# Patient Record
Sex: FEMALE | Race: WHITE
[De-identification: names, ages, dates, MRNs, and addresses within clinical notes are randomized per-mention and may not be internally consistent; named-entity substitution may affect disease eponyms.]

---

## 2019-01-27 ENCOUNTER — HOSPITAL ENCOUNTER (INPATIENT)
Dept: HOSPITAL 26 - MED | Age: 84
LOS: 1 days | Discharge: ANOTHER HOSPITAL | DRG: 871 | End: 2019-01-28
Attending: GENERAL PRACTICE | Admitting: GENERAL PRACTICE
Payer: COMMERCIAL

## 2019-01-27 VITALS — SYSTOLIC BLOOD PRESSURE: 102 MMHG | DIASTOLIC BLOOD PRESSURE: 58 MMHG

## 2019-01-27 VITALS
HEIGHT: 67 IN | DIASTOLIC BLOOD PRESSURE: 56 MMHG | BODY MASS INDEX: 32.04 KG/M2 | SYSTOLIC BLOOD PRESSURE: 118 MMHG | WEIGHT: 204.13 LBS

## 2019-01-27 VITALS — DIASTOLIC BLOOD PRESSURE: 48 MMHG | SYSTOLIC BLOOD PRESSURE: 104 MMHG

## 2019-01-27 DIAGNOSIS — N39.0: ICD-10-CM

## 2019-01-27 DIAGNOSIS — Z79.01: ICD-10-CM

## 2019-01-27 DIAGNOSIS — H35.30: ICD-10-CM

## 2019-01-27 DIAGNOSIS — E11.65: ICD-10-CM

## 2019-01-27 DIAGNOSIS — E83.42: ICD-10-CM

## 2019-01-27 DIAGNOSIS — Z79.84: ICD-10-CM

## 2019-01-27 DIAGNOSIS — K58.9: ICD-10-CM

## 2019-01-27 DIAGNOSIS — Z95.0: ICD-10-CM

## 2019-01-27 DIAGNOSIS — Z86.73: ICD-10-CM

## 2019-01-27 DIAGNOSIS — E87.1: ICD-10-CM

## 2019-01-27 DIAGNOSIS — I24.9: ICD-10-CM

## 2019-01-27 DIAGNOSIS — Z79.899: ICD-10-CM

## 2019-01-27 DIAGNOSIS — M94.0: ICD-10-CM

## 2019-01-27 DIAGNOSIS — I21.A1: ICD-10-CM

## 2019-01-27 DIAGNOSIS — A41.9: Primary | ICD-10-CM

## 2019-01-27 DIAGNOSIS — I10: ICD-10-CM

## 2019-01-27 DIAGNOSIS — I49.5: ICD-10-CM

## 2019-01-27 DIAGNOSIS — K21.9: ICD-10-CM

## 2019-01-27 DIAGNOSIS — N17.0: ICD-10-CM

## 2019-01-27 LAB
ALBUMIN FLD-MCNC: 3.6 G/DL (ref 3.4–5)
AMYLASE SERPL-CCNC: 13 U/L (ref 25–115)
ANION GAP SERPL CALCULATED.3IONS-SCNC: 15.8 MMOL/L (ref 8–16)
APPEARANCE UR: (no result)
AST SERPL-CCNC: 16 U/L (ref 15–37)
BASOPHILS # BLD AUTO: 0 K/UL (ref 0–0.22)
BASOPHILS NFR BLD AUTO: 0.2 % (ref 0–2)
BILIRUB SERPL-MCNC: 0.8 MG/DL (ref 0–1)
BILIRUB UR QL STRIP: NEGATIVE
BUN SERPL-MCNC: 29 MG/DL (ref 7–18)
CHLORIDE SERPL-SCNC: 96 MMOL/L (ref 98–107)
CO2 SERPL-SCNC: 22.7 MMOL/L (ref 21–32)
COLOR UR: YELLOW
CREAT SERPL-MCNC: 1.7 MG/DL (ref 0.6–1.3)
EOSINOPHIL # BLD AUTO: 0 K/UL (ref 0–0.4)
EOSINOPHIL NFR BLD AUTO: 0.2 % (ref 0–4)
ERYTHROCYTE [DISTWIDTH] IN BLOOD BY AUTOMATED COUNT: 13.2 % (ref 11.6–13.7)
GFR SERPL CREATININE-BSD FRML MDRD: (no result) ML/MIN (ref 90–?)
GLUCOSE SERPL-MCNC: 245 MG/DL (ref 74–106)
GLUCOSE UR STRIP-MCNC: NEGATIVE MG/DL
HCT VFR BLD AUTO: 36.3 % (ref 36–48)
HGB BLD-MCNC: 11.8 G/DL (ref 12–16)
HGB UR QL STRIP: NEGATIVE
LEUKOCYTE ESTERASE UR QL STRIP: (no result)
LIPASE SERPL-CCNC: 51 U/L (ref 73–393)
LYMPHOCYTES # BLD AUTO: 1.9 K/UL (ref 2.5–16.5)
LYMPHOCYTES NFR BLD AUTO: 12.2 % (ref 20.5–51.1)
MAGNESIUM SERPL-MCNC: 1.2 MG/DL (ref 1.8–2.4)
MCH RBC QN AUTO: 30 PG (ref 27–31)
MCHC RBC AUTO-ENTMCNC: 33 G/DL (ref 33–37)
MCV RBC AUTO: 91.2 FL (ref 80–94)
MONOCYTES # BLD AUTO: 1 K/UL (ref 0.8–1)
MONOCYTES NFR BLD AUTO: 6.2 % (ref 1.7–9.3)
NEUTROPHILS # BLD AUTO: 12.6 K/UL (ref 1.8–7.7)
NEUTROPHILS NFR BLD AUTO: 81.2 % (ref 42.2–75.2)
NITRITE UR QL STRIP: POSITIVE
PH UR STRIP: 5.5 [PH] (ref 5–9)
PHOSPHATE SERPL-MCNC: 3.1 MG/DL (ref 2.5–4.9)
PLATELET # BLD AUTO: 267 K/UL (ref 140–450)
POTASSIUM SERPL-SCNC: 3.5 MMOL/L (ref 3.5–5.1)
PROTHROMBIN TIME: 10.4 SECS (ref 10.8–13.4)
RBC # BLD AUTO: 3.98 MIL/UL (ref 4.2–5.4)
RBC #/AREA URNS HPF: (no result) /HPF (ref 0–5)
SODIUM SERPL-SCNC: 131 MMOL/L (ref 136–145)
TSH SERPL DL<=0.05 MIU/L-ACNC: 0.66 UIU/ML (ref 0.34–3.74)
WBC # BLD AUTO: 15.5 K/UL (ref 4.8–10.8)
WBC,URINE: (no result) /HPF (ref 0–5)

## 2019-01-27 RX ADMIN — SODIUM CHLORIDE SCH MLS/HR: 9 INJECTION, SOLUTION INTRAVENOUS at 15:00

## 2019-01-27 RX ADMIN — Medication SCH DEV: at 16:30

## 2019-01-27 RX ADMIN — SODIUM CHLORIDE SCH MLS/HR: 9 INJECTION, SOLUTION INTRAVENOUS at 21:59

## 2019-01-27 RX ADMIN — Medication SCH DEV: at 20:30

## 2019-01-27 NOTE — NUR
PT BIBA AND RECEIVED TO BED 10 JOSEE MORGAN. ASSUMED CARE OF PT AT THIS TIME. PER 
EMS, [place] [facility] [place]ELOPED V-TACH RHYTHM W/ PULSES AND WAS 
REROUTED TO [facility]. ON ARRIVAL TO ER, PT WAS IN PACED RHYTHM, HAD C/O FEVER W/ 
N/V X 5 DAYS AND LOOSE STOOLS X 1 DAY. PT IS AFEBRILE AT THIS TIME.PATIENT 
STATES PAIN OF 0/10; VSS; PATIENT POSITIONED FOR COMFORT; HOB ELEVATED; 
BEDRAILS UP X2; BED DOWN. ER MD MADE AWARE OF PT STATUS. WILL CONTINUE TO 
MONITOR.

## 2019-01-27 NOTE — NUR
awaiting transfer admission. patient resting with eyes closed. daughter by 
bedside. nad. will continue to monitor.

## 2019-01-27 NOTE — NUR
BILATERAL MACULAR DEGENERATION NOTED. ABLE TO IDENTIFY SHAPED. REORIENTED TO AREAS AROUND 
ROOM AND  BEDSIDE TABLE

## 2019-01-27 NOTE — NUR
Assumed care of patient. Pt is aox4 to person, place, time, and situation. RR 
are even and unlabored. Awaiting radiology and lab results. NAD. Will continue 
to monitor.

## 2019-01-27 NOTE — NUR
RECEIVED REPORT FROM AM SHIFT. PT AOX4. ABLE TO VERBALIZE NEEDS. FOLLOWS COMMANDS. AFEBRILE. 
EVEN UNLABORED BREATHING. LUNG SOUNDS CLEAR BILAT. DENIES CHEST PAIN. ABD SOFT NONTENDER. 
BOWEL SOUNDS ACTIVE X4 QUADRANTS. STRESS INCONTINENCE NOTED. IV SITE LEFT WRIST 18G. SITE 
CLEAN. AMBULATORY WITH ASSIST. BED IN LOWEST POSITION. SIDE RAILS UP X2. WILL CONTINUE TO 
MONITOR.

-------------------------------------------------------------------------------

Addendum: 01/27/19 at 1646 by Pk Hilton RN

-------------------------------------------------------------------------------

WRONG TIME 1903

## 2019-01-27 NOTE — NUR
RECEIVED REPORT FROM AM SHIFT. PT AOX4. ABLE TO VERBALIZE NEEDS. FOLLOWS COMMANDS. AFEBRILE. 
EVEN UNLABORED BREATHING. LUNG SOUNDS CLEAR BILAT. DENIES CHEST PAIN. ABD SOFT NONTENDER. 
BOWEL SOUNDS ACTIVE X4 QUADRANTS. STRESS INCONTINENCE NOTED. IV SITE LEFT WRIST 18G. SITE 
CLEAN. AMBULATORY WITH ASSIST. BED IN LOWEST POSITION. SIDE RAILS UP X2. WILL CONTINUE TO 
MONITOR

## 2019-01-27 NOTE — NUR
BILATERAL MACULAR DEGENERATION NOTED. ABLE TO IDENTIFY SHAPED. REORIENTED TO AREAS AROUND 
ROOM AND TABLE.

-------------------------------------------------------------------------------

Addendum: 01/28/19 at 0739 by Pk Hilton RN

-------------------------------------------------------------------------------

AILIN TIME 1937

## 2019-01-27 NOTE — NUR
RECEIVED REPORT FROM EMERGENCY ROOM NURSE FOR CONTINUITY OF CARE. IV INTACT AND PATENT. 
SAFETY MEASURES IN PLACE. BED IN LOW POSITION. WILL CONTINUE TO MONITOR.

## 2019-01-27 NOTE — NUR
Patient will be admitted to [facility]. Admited to Tele.  Will go to room 
121-A. Belongings list completed.  Bedside report to Agata DANIELS.

## 2019-01-28 VITALS — SYSTOLIC BLOOD PRESSURE: 90 MMHG | DIASTOLIC BLOOD PRESSURE: 56 MMHG

## 2019-01-28 VITALS — SYSTOLIC BLOOD PRESSURE: 133 MMHG | DIASTOLIC BLOOD PRESSURE: 59 MMHG

## 2019-01-28 VITALS — DIASTOLIC BLOOD PRESSURE: 52 MMHG | SYSTOLIC BLOOD PRESSURE: 117 MMHG

## 2019-01-28 VITALS — DIASTOLIC BLOOD PRESSURE: 52 MMHG | SYSTOLIC BLOOD PRESSURE: 136 MMHG

## 2019-01-28 VITALS — SYSTOLIC BLOOD PRESSURE: 119 MMHG | DIASTOLIC BLOOD PRESSURE: 51 MMHG

## 2019-01-28 VITALS — DIASTOLIC BLOOD PRESSURE: 46 MMHG | SYSTOLIC BLOOD PRESSURE: 126 MMHG

## 2019-01-28 LAB
ANION GAP SERPL CALCULATED.3IONS-SCNC: 6.1 MMOL/L (ref 8–16)
BASOPHILS # BLD AUTO: 0 K/UL (ref 0–0.22)
BASOPHILS NFR BLD AUTO: 0.1 % (ref 0–2)
BUN SERPL-MCNC: 25 MG/DL (ref 7–18)
CHLORIDE SERPL-SCNC: 108 MMOL/L (ref 98–107)
CHOLEST/HDLC SERPL: 2.4 {RATIO} (ref 1–4.5)
CO2 SERPL-SCNC: 27.1 MMOL/L (ref 21–32)
CREAT SERPL-MCNC: 1.3 MG/DL (ref 0.6–1.3)
EOSINOPHIL # BLD AUTO: 0.1 K/UL (ref 0–0.4)
EOSINOPHIL NFR BLD AUTO: 1.4 % (ref 0–4)
ERYTHROCYTE [DISTWIDTH] IN BLOOD BY AUTOMATED COUNT: 13.3 % (ref 11.6–13.7)
GFR SERPL CREATININE-BSD FRML MDRD: (no result) ML/MIN (ref 90–?)
GLUCOSE SERPL-MCNC: 105 MG/DL (ref 74–106)
HCT VFR BLD AUTO: 28.5 % (ref 36–48)
HDLC SERPL-MCNC: 49 MG/DL (ref 40–60)
HGB BLD-MCNC: 9.4 G/DL (ref 12–16)
LDLC SERPL CALC-MCNC: 60 MG/DL (ref 60–100)
LYMPHOCYTES # BLD AUTO: 1.7 K/UL (ref 2.5–16.5)
LYMPHOCYTES NFR BLD AUTO: 16.5 % (ref 20.5–51.1)
MAGNESIUM SERPL-MCNC: 1.3 MG/DL (ref 1.8–2.4)
MCH RBC QN AUTO: 30 PG (ref 27–31)
MCHC RBC AUTO-ENTMCNC: 33 G/DL (ref 33–37)
MCV RBC AUTO: 91.9 FL (ref 80–94)
MONOCYTES # BLD AUTO: 0.6 K/UL (ref 0.8–1)
MONOCYTES NFR BLD AUTO: 5.9 % (ref 1.7–9.3)
NEUTROPHILS # BLD AUTO: 7.8 K/UL (ref 1.8–7.7)
NEUTROPHILS NFR BLD AUTO: 76.1 % (ref 42.2–75.2)
PHOSPHATE SERPL-MCNC: 2.7 MG/DL (ref 2.5–4.9)
PLATELET # BLD AUTO: 177 K/UL (ref 140–450)
POTASSIUM SERPL-SCNC: 4.2 MMOL/L (ref 3.5–5.1)
RBC # BLD AUTO: 3.1 MIL/UL (ref 4.2–5.4)
SODIUM SERPL-SCNC: 137 MMOL/L (ref 136–145)
TRIGL SERPL-MCNC: 55 MG/DL (ref 30–150)
WBC # BLD AUTO: 10.2 K/UL (ref 4.8–10.8)

## 2019-01-28 RX ADMIN — MAGNESIUM SULFATE IN WATER SCH MLS/HR: 40 INJECTION, SOLUTION INTRAVENOUS at 14:34

## 2019-01-28 RX ADMIN — Medication SCH DEV: at 16:30

## 2019-01-28 RX ADMIN — SODIUM CHLORIDE SCH MLS/HR: 9 INJECTION, SOLUTION INTRAVENOUS at 15:53

## 2019-01-28 RX ADMIN — Medication SCH DEV: at 12:18

## 2019-01-28 RX ADMIN — MAGNESIUM SULFATE IN WATER SCH MLS/HR: 40 INJECTION, SOLUTION INTRAVENOUS at 12:15

## 2019-01-28 RX ADMIN — Medication SCH DEV: at 20:26

## 2019-01-28 RX ADMIN — SODIUM CHLORIDE SCH MLS/HR: 9 INJECTION, SOLUTION INTRAVENOUS at 00:54

## 2019-01-28 RX ADMIN — Medication SCH DEV: at 06:40

## 2019-01-28 NOTE — NUR
ENDORSED AND GAVE REPORT REGARDING THE PT TO NIGHT SHIFT NURSELITZY FOR CONTINUITY OF 
THE DISCHARGE AND TRANSFER PROCESS TO [facility].  PT IS STABLE AT THIS TIME.

## 2019-01-28 NOTE — NUR
PATIENT HAS BEEN SCREENED AND CATEGORIZED AS HIGH NUTRITION RISK. PATIENT WILL BE SEEN 
WITHIN 1-2 DAYS OF ADMISSION.



01/28/19-01/29/19



JOSEPH ARI[address] RD

## 2019-01-28 NOTE — NUR
RECEIVED BEDSIDE REPORT FROM DAY SHIFT RN. PT IS A&OX4. ON ROOM AIR. NO S/S OF DISTRESS AT 
THIS TIME. PT IV HAS BEEN REMOVED AND DISCHARGE PAPER WORK DONE AND SIGNED BY PT. WAITING TO 
TRANSFER TO [facility] ROOM 692 A TO DR RAMIREZ. SKIN IS INTACT. PT CAN AMBULATE WITH 
ASSISTANCE. PT IS INCONTINENT. HAS FAMILY AT BEDSIDE.CALL LIGHT WITHIN REACH. WILL CONTINUE 
TO MONITOR

## 2019-01-28 NOTE — NUR
PT C/O PAIN 5/10 TO BACK. ENCOURAGED REPOSITIONING FREQUENTLY. ADMINISTERED NORCO PO. WILL 
CONTINUE TO MONITOR.

## 2019-01-28 NOTE — NUR
PT IS AWAKE AND VITAL SIGNS TAKEN AND IS WITHIN NORMAL LIMIT, BLOOD GLUCOSE CHECK DONE AND 
RESULT  AND NO INSULIN COVERAGE NEEDED. WILL MONITOR PT.

## 2019-01-28 NOTE — NUR
RECEIVED PT FROM NIGHT SHIFT NURSE, PT IS AWAKE AND LYING ON THE BED WITH SIDE RAILS UP AND 
CALL LIGHT WITHIN REACH. PT HAS AN IV LINE ON THE LEFT HAND G. 22 INTACT WITH NS AT 100ML/HR 
INFUSING. PT DENIES PAIN AND NO SOB NOTED. WILL MONITOR PT.

## 2019-01-28 NOTE — NUR
GAVE REPORT TO AMBULANCE. TELE MONITOR REMOVED. PT LEAVING WITHOUT IV. IS BEING TRANSFERRED 
TO [facility] ROOM 692 A. PT IS IN STABLE CONDITION.

## 2019-01-28 NOTE — NUR
ALFONZO NOTE



PER LUNA AT [facility] TRANSFER UNIT PH# [phone], SHE IS STILL ACTIVELY LOOKING FOR A BED 
FOR PATIENT.  LUNA STATED THAT AT THIS TIME NO BED AVAILABLE BUT SHE IS ANTICIPATING 
POSSIBLE DISCHARGES TODAY.  PROVIDED LUNA WITH THE NUMBER TO THE NURSING STATION WHERE 
PATIENT IS IN CASE A BED BECOMES AVAILABLE AT A LATER TIME.



PER ALFONZO JONES [facility] PH# [phone], FOR [facility] AUTH# [id number].  



CHARGE NURSE KARY LOUIE.

## 2019-01-28 NOTE — NUR
A STAFF FORM [facility] CALLED AND INFORMED THAT PT HAS AN AVAILABLE ROOM. PT 
WILL BE PLACE IN -A AND ACCEPTING DOCTOR IS DR. LEANN ARREGUIN.

## 2019-01-28 NOTE — NUR
PT IS AWAKE, SON ON THE BEDSIDE, BLOOD GLUCOSE CHECK DONE AND RESULT  AND NO INSULIN 
COVERAGE NEEDED,  MAGNESIUM IV WAS GIVEN TO PT, PT TOLERATED IT AND WILL CONTINUE TO BE 
MONITORED

## 2019-01-28 NOTE — NUR
1042  RECEIVED CALL FROM LUNA AT [facility] TRANSFER UNIT THAT SHE RECEIVED AN INQUIRY FROM 
[facility] REGARDING INPATIENT BED AVAILABILITY AS THE PLAN IS TO TRANSFER PT TO [facility] PENDING 
BED AVAILABILITY.  PROVIDED LUNA WITH PATIENTS CURRENT LEVEL OF CARE TELE AND PENDING 
CULTURE REPORTS.  LUNA WILL CALL BACK WHEN BED AVAILABLE.  PROVIDED HER WITH RESIDENT 
DOCTORS CELL NUMBER.

## 2019-01-28 NOTE — NUR
PT IS AWAKE AND WA ASSISTED TO USE THE BEDSIDE COMMODE, V/S TAKEN AND IS WITHIN NORMAL LIMIT 
AS PARAMETER FOR ORAL MEDICATIONS, PT TOLERATED, NO SIGN OF DISTRESS NOTED AND WILL CONTINUE 
TO MONITOR PT.